# Patient Record
Sex: MALE | Race: WHITE | Employment: FULL TIME | URBAN - METROPOLITAN AREA
[De-identification: names, ages, dates, MRNs, and addresses within clinical notes are randomized per-mention and may not be internally consistent; named-entity substitution may affect disease eponyms.]

---

## 2018-05-30 VITALS
WEIGHT: 287 LBS | HEIGHT: 76 IN | DIASTOLIC BLOOD PRESSURE: 80 MMHG | RESPIRATION RATE: 16 BRPM | TEMPERATURE: 97.3 F | BODY MASS INDEX: 34.95 KG/M2 | SYSTOLIC BLOOD PRESSURE: 124 MMHG | HEART RATE: 96 BPM

## 2020-01-08 ENCOUNTER — HOSPITAL ENCOUNTER (EMERGENCY)
Age: 50
Discharge: HOME | End: 2020-01-08
Attending: EMERGENCY MEDICINE
Payer: COMMERCIAL

## 2020-01-08 VITALS
TEMPERATURE: 98.2 F | RESPIRATION RATE: 17 BRPM | OXYGEN SATURATION: 95 % | SYSTOLIC BLOOD PRESSURE: 125 MMHG | DIASTOLIC BLOOD PRESSURE: 80 MMHG | HEART RATE: 83 BPM

## 2020-01-08 PROCEDURE — 99282 EMERGENCY DEPT VISIT SF MDM: CPT

## 2020-01-08 ASSESSMENT — PAIN - FUNCTIONAL ASSESSMENT: PAIN_FUNCTIONAL_ASSESSMENT: ACTIVITIES ARE NOT PREVENTED

## 2020-01-08 ASSESSMENT — ENCOUNTER SYMPTOMS
PHOTOPHOBIA: 0
EYE REDNESS: 1

## 2020-01-08 ASSESSMENT — PAIN DESCRIPTION - FREQUENCY: FREQUENCY: INTERMITTENT

## 2020-01-08 ASSESSMENT — PAIN SCALES - GENERAL: PAINLEVEL_OUTOF10: 2

## 2020-01-08 ASSESSMENT — PAIN DESCRIPTION - DESCRIPTORS: DESCRIPTORS: PINS AND NEEDLES

## 2020-01-08 ASSESSMENT — PAIN DESCRIPTION - ONSET: ONSET: GRADUAL

## 2020-01-08 ASSESSMENT — PAIN DESCRIPTION - ORIENTATION: ORIENTATION: LEFT

## 2020-01-08 ASSESSMENT — PAIN DESCRIPTION - LOCATION: LOCATION: EYE

## 2020-01-08 ASSESSMENT — PAIN DESCRIPTION - PROGRESSION: CLINICAL_PROGRESSION: GRADUALLY WORSENING

## 2020-01-08 ASSESSMENT — PAIN DESCRIPTION - PAIN TYPE: TYPE: ACUTE PAIN

## 2020-01-08 NOTE — ED TRIAGE NOTES
Pt came in for concerns of pink eye in left eye. Pt states it started yesterday. Pt noticed pink coloration and swelling. Pt states it is an intermittent pain.

## 2020-01-08 NOTE — ED PROVIDER NOTES
normal.  Neck: Neck supple. No tracheal deviation present. CV: Normal rate. Regular rhythm. Chest: Effort normal.   Neurological: Alert and appropriately interactive. Face symmetric, speech without dysarthria or obvious aphasia. Moving all extremities spontaneously. Skin: Warm, dry. No rash. No diaphoresis or erythema. Psychiatric: Calm and cooperative with appropriate mood and affect. Diagnostic Results       RADIOLOGY:  No orders to display       LABS:   No results found for this visit on 01/08/20. RECENT VITALS:   , ,  ,  ,       Procedures   Procedures      ED Course     Nursing Notes, Past Medical Hx, Past Surgical Hx, Social Hx,Allergies, and Family Hx were reviewed. Patient was given the following medications:  No orders of the defined types were placed in this encounter. CONSULTS:  None    MEDICAL DECISIONMAKING / ASSESSMENT / Di Everett is a 44 y.o. male presenting for left eye redness and irritation. On arrival he is in no distress and vital signs reassuring. Has conjunctival erythema with eric-limbic sparing consistent with viral conjunctivitis given the lack of purulence and only mild crusting. Instructed to use antihistamine eyedrops as this will improve the redness and irritation and otherwise maintain good hand hygiene and to not come in contact with the other eye so as to avoid infection there. All questions answered to satisfaction. Discharged in stable condition with written and verbal instructions for which to return to the ED. Clinical Impression     1.  Acute viral conjunctivitis of left eye        Disposition     PATIENT REFERRED TO:  [facility] Emergency Department  [address]  [place] [address]  [phone]    If symptoms worsen    [facility]  [address]   [phone]    Schedule an appointment as soon as possible for a visit   To establish care      DISCHARGE

## 2022-04-11 ENCOUNTER — HOSPITAL ENCOUNTER (INPATIENT)
Age: 52
LOS: 1 days | Discharge: HOME | DRG: 392 | End: 2022-04-12
Attending: EMERGENCY MEDICINE | Admitting: INTERNAL MEDICINE
Payer: COMMERCIAL

## 2022-04-11 ENCOUNTER — APPOINTMENT (OUTPATIENT)
Dept: CT IMAGING | Age: 52
DRG: 392 | End: 2022-04-11
Payer: COMMERCIAL

## 2022-04-11 ENCOUNTER — APPOINTMENT (OUTPATIENT)
Dept: GENERAL RADIOLOGY | Age: 52
DRG: 392 | End: 2022-04-11
Payer: COMMERCIAL

## 2022-04-11 DIAGNOSIS — E86.1 HYPOTENSION DUE TO HYPOVOLEMIA: ICD-10-CM

## 2022-04-11 DIAGNOSIS — I95.89 HYPOTENSION DUE TO HYPOVOLEMIA: ICD-10-CM

## 2022-04-11 DIAGNOSIS — E86.0 DEHYDRATION: Primary | ICD-10-CM

## 2022-04-11 PROBLEM — R07.9 CHEST PAIN: Status: ACTIVE | Noted: 2022-04-11

## 2022-04-11 LAB
ANION GAP SERPL CALCULATED.3IONS-SCNC: 14 MMOL/L (ref 3–16)
ANION GAP SERPL CALCULATED.3IONS-SCNC: 15 MMOL/L (ref 3–16)
BASOPHILS ABSOLUTE: 0 K/UL (ref 0–0.2)
BASOPHILS RELATIVE PERCENT: 0.4 %
BUN BLDV-MCNC: 12 MG/DL (ref 7–20)
BUN BLDV-MCNC: 9 MG/DL (ref 7–20)
CALCIUM SERPL-MCNC: 9.4 MG/DL (ref 8.3–10.6)
CALCIUM SERPL-MCNC: 9.9 MG/DL (ref 8.3–10.6)
CHLORIDE BLD-SCNC: 104 MMOL/L (ref 99–110)
CHLORIDE BLD-SCNC: 106 MMOL/L (ref 99–110)
CO2: 19 MMOL/L (ref 21–32)
CO2: 25 MMOL/L (ref 21–32)
CREAT SERPL-MCNC: 0.7 MG/DL (ref 0.9–1.3)
CREAT SERPL-MCNC: 0.8 MG/DL (ref 0.9–1.3)
EKG ATRIAL RATE: 104 BPM
EKG ATRIAL RATE: 59 BPM
EKG ATRIAL RATE: 73 BPM
EKG DIAGNOSIS: NORMAL
EKG P AXIS: 54 DEGREES
EKG P AXIS: 56 DEGREES
EKG P AXIS: 66 DEGREES
EKG P-R INTERVAL: 170 MS
EKG P-R INTERVAL: 192 MS
EKG P-R INTERVAL: 200 MS
EKG Q-T INTERVAL: 354 MS
EKG Q-T INTERVAL: 410 MS
EKG Q-T INTERVAL: 440 MS
EKG QRS DURATION: 126 MS
EKG QRS DURATION: 90 MS
EKG QRS DURATION: 92 MS
EKG QTC CALCULATION (BAZETT): 435 MS
EKG QTC CALCULATION (BAZETT): 451 MS
EKG QTC CALCULATION (BAZETT): 465 MS
EKG R AXIS: 39 DEGREES
EKG R AXIS: 60 DEGREES
EKG R AXIS: 81 DEGREES
EKG T AXIS: 28 DEGREES
EKG T AXIS: 57 DEGREES
EKG T AXIS: 69 DEGREES
EKG VENTRICULAR RATE: 104 BPM
EKG VENTRICULAR RATE: 59 BPM
EKG VENTRICULAR RATE: 73 BPM
EOSINOPHILS ABSOLUTE: 0.1 K/UL (ref 0–0.6)
EOSINOPHILS RELATIVE PERCENT: 1.4 %
GFR AFRICAN AMERICAN: >60
GFR AFRICAN AMERICAN: >60
GFR NON-AFRICAN AMERICAN: >60
GFR NON-AFRICAN AMERICAN: >60
GLUCOSE BLD-MCNC: 133 MG/DL (ref 70–99)
GLUCOSE BLD-MCNC: 138 MG/DL (ref 70–99)
GLUCOSE BLD-MCNC: 145 MG/DL (ref 70–99)
GLUCOSE BLD-MCNC: 152 MG/DL (ref 70–99)
GLUCOSE BLD-MCNC: 153 MG/DL (ref 70–99)
GLUCOSE BLD-MCNC: 156 MG/DL (ref 70–99)
GLUCOSE BLD-MCNC: 159 MG/DL (ref 70–99)
HCT VFR BLD CALC: 43.6 % (ref 40.5–52.5)
HEMOGLOBIN: 14.8 G/DL (ref 13.5–17.5)
LACTIC ACID: 2.3 MMOL/L (ref 0.4–2)
LACTIC ACID: 2.6 MMOL/L (ref 0.4–2)
LV EF: 58 %
LV EF: 73 %
LVEF MODALITY: NORMAL
LVEF MODALITY: NORMAL
LYMPHOCYTES ABSOLUTE: 2.7 K/UL (ref 1–5.1)
LYMPHOCYTES RELATIVE PERCENT: 29.1 %
MAGNESIUM: 2.1 MG/DL (ref 1.8–2.4)
MCH RBC QN AUTO: 31.1 PG (ref 26–34)
MCHC RBC AUTO-ENTMCNC: 34 G/DL (ref 31–36)
MCV RBC AUTO: 91.6 FL (ref 80–100)
MONOCYTES ABSOLUTE: 0.8 K/UL (ref 0–1.3)
MONOCYTES RELATIVE PERCENT: 9.1 %
NEUTROPHILS ABSOLUTE: 5.5 K/UL (ref 1.7–7.7)
NEUTROPHILS RELATIVE PERCENT: 60 %
PDW BLD-RTO: 13.7 % (ref 12.4–15.4)
PERFORMED ON: ABNORMAL
PLATELET # BLD: 272 K/UL (ref 135–450)
PMV BLD AUTO: 7.5 FL (ref 5–10.5)
POTASSIUM REFLEX MAGNESIUM: 3.3 MMOL/L (ref 3.5–5.1)
POTASSIUM REFLEX MAGNESIUM: 4.2 MMOL/L (ref 3.5–5.1)
PRO-BNP: <5 PG/ML (ref 0–124)
RBC # BLD: 4.77 M/UL (ref 4.2–5.9)
SODIUM BLD-SCNC: 140 MMOL/L (ref 136–145)
SODIUM BLD-SCNC: 143 MMOL/L (ref 136–145)
TROPONIN: <0.01 NG/ML
WBC # BLD: 9.2 K/UL (ref 4–11)

## 2022-04-11 PROCEDURE — 96361 HYDRATE IV INFUSION ADD-ON: CPT

## 2022-04-11 PROCEDURE — 83735 ASSAY OF MAGNESIUM: CPT

## 2022-04-11 PROCEDURE — 6370000000 HC RX 637 (ALT 250 FOR IP): Performed by: INTERNAL MEDICINE

## 2022-04-11 PROCEDURE — 96375 TX/PRO/DX INJ NEW DRUG ADDON: CPT

## 2022-04-11 PROCEDURE — 99284 EMERGENCY DEPT VISIT MOD MDM: CPT

## 2022-04-11 PROCEDURE — A9502 TC99M TETROFOSMIN: HCPCS

## 2022-04-11 PROCEDURE — 36415 COLL VENOUS BLD VENIPUNCTURE: CPT

## 2022-04-11 PROCEDURE — 80048 BASIC METABOLIC PNL TOTAL CA: CPT

## 2022-04-11 PROCEDURE — 6360000002 HC RX W HCPCS

## 2022-04-11 PROCEDURE — C9113 INJ PANTOPRAZOLE SODIUM, VIA: HCPCS | Performed by: INTERNAL MEDICINE

## 2022-04-11 PROCEDURE — 99223 1ST HOSP IP/OBS HIGH 75: CPT | Performed by: INTERNAL MEDICINE

## 2022-04-11 PROCEDURE — 84484 ASSAY OF TROPONIN QUANT: CPT

## 2022-04-11 PROCEDURE — 96374 THER/PROPH/DIAG INJ IV PUSH: CPT

## 2022-04-11 PROCEDURE — 96372 THER/PROPH/DIAG INJ SC/IM: CPT

## 2022-04-11 PROCEDURE — 93005 ELECTROCARDIOGRAM TRACING: CPT | Performed by: INTERNAL MEDICINE

## 2022-04-11 PROCEDURE — 2580000003 HC RX 258: Performed by: INTERNAL MEDICINE

## 2022-04-11 PROCEDURE — 83880 ASSAY OF NATRIURETIC PEPTIDE: CPT

## 2022-04-11 PROCEDURE — 78452 HT MUSCLE IMAGE SPECT MULT: CPT

## 2022-04-11 PROCEDURE — 1200000000 HC SEMI PRIVATE

## 2022-04-11 PROCEDURE — 6360000002 HC RX W HCPCS: Performed by: INTERNAL MEDICINE

## 2022-04-11 PROCEDURE — 2580000003 HC RX 258: Performed by: EMERGENCY MEDICINE

## 2022-04-11 PROCEDURE — 93010 ELECTROCARDIOGRAM REPORT: CPT | Performed by: INTERNAL MEDICINE

## 2022-04-11 PROCEDURE — 93017 CV STRESS TEST TRACING ONLY: CPT

## 2022-04-11 PROCEDURE — 71045 X-RAY EXAM CHEST 1 VIEW: CPT

## 2022-04-11 PROCEDURE — 6360000004 HC RX CONTRAST MEDICATION: Performed by: EMERGENCY MEDICINE

## 2022-04-11 PROCEDURE — 93306 TTE W/DOPPLER COMPLETE: CPT

## 2022-04-11 PROCEDURE — 85025 COMPLETE CBC W/AUTO DIFF WBC: CPT

## 2022-04-11 PROCEDURE — 3430000000 HC RX DIAGNOSTIC RADIOPHARMACEUTICAL

## 2022-04-11 PROCEDURE — 6360000002 HC RX W HCPCS: Performed by: EMERGENCY MEDICINE

## 2022-04-11 PROCEDURE — 83605 ASSAY OF LACTIC ACID: CPT

## 2022-04-11 PROCEDURE — G0378 HOSPITAL OBSERVATION PER HR: HCPCS

## 2022-04-11 PROCEDURE — 74174 CTA ABD&PLVS W/CONTRAST: CPT

## 2022-04-11 PROCEDURE — 93005 ELECTROCARDIOGRAM TRACING: CPT | Performed by: EMERGENCY MEDICINE

## 2022-04-11 RX ORDER — SODIUM CHLORIDE 0.9 % (FLUSH) 0.9 %
5-40 SYRINGE (ML) INJECTION PRN
Status: DISCONTINUED | OUTPATIENT
Start: 2022-04-11 | End: 2022-04-12 | Stop reason: HOSPADM

## 2022-04-11 RX ORDER — ONDANSETRON 2 MG/ML
8 INJECTION INTRAMUSCULAR; INTRAVENOUS ONCE
Status: COMPLETED | OUTPATIENT
Start: 2022-04-11 | End: 2022-04-11

## 2022-04-11 RX ORDER — SODIUM CHLORIDE, SODIUM LACTATE, POTASSIUM CHLORIDE, AND CALCIUM CHLORIDE .6; .31; .03; .02 G/100ML; G/100ML; G/100ML; G/100ML
1000 INJECTION, SOLUTION INTRAVENOUS ONCE
Status: COMPLETED | OUTPATIENT
Start: 2022-04-11 | End: 2022-04-11

## 2022-04-11 RX ORDER — SODIUM CHLORIDE 0.9 % (FLUSH) 0.9 %
10 SYRINGE (ML) INJECTION PRN
Status: DISCONTINUED | OUTPATIENT
Start: 2022-04-11 | End: 2022-04-12 | Stop reason: HOSPADM

## 2022-04-11 RX ORDER — ONDANSETRON 4 MG/1
4 TABLET, ORALLY DISINTEGRATING ORAL EVERY 8 HOURS PRN
Status: DISCONTINUED | OUTPATIENT
Start: 2022-04-11 | End: 2022-04-12 | Stop reason: HOSPADM

## 2022-04-11 RX ORDER — ONDANSETRON 2 MG/ML
4 INJECTION INTRAMUSCULAR; INTRAVENOUS EVERY 6 HOURS PRN
Status: DISCONTINUED | OUTPATIENT
Start: 2022-04-11 | End: 2022-04-12 | Stop reason: HOSPADM

## 2022-04-11 RX ORDER — SODIUM CHLORIDE 0.9 % (FLUSH) 0.9 %
5-40 SYRINGE (ML) INJECTION EVERY 12 HOURS SCHEDULED
Status: DISCONTINUED | OUTPATIENT
Start: 2022-04-11 | End: 2022-04-12 | Stop reason: HOSPADM

## 2022-04-11 RX ORDER — POTASSIUM CHLORIDE 7.45 MG/ML
10 INJECTION INTRAVENOUS PRN
Status: DISCONTINUED | OUTPATIENT
Start: 2022-04-11 | End: 2022-04-12 | Stop reason: HOSPADM

## 2022-04-11 RX ORDER — ACETAMINOPHEN 325 MG/1
650 TABLET ORAL EVERY 6 HOURS PRN
Status: DISCONTINUED | OUTPATIENT
Start: 2022-04-11 | End: 2022-04-12 | Stop reason: HOSPADM

## 2022-04-11 RX ORDER — PANTOPRAZOLE SODIUM 40 MG/10ML
40 INJECTION, POWDER, LYOPHILIZED, FOR SOLUTION INTRAVENOUS DAILY
Status: DISCONTINUED | OUTPATIENT
Start: 2022-04-11 | End: 2022-04-12 | Stop reason: HOSPADM

## 2022-04-11 RX ORDER — POTASSIUM CHLORIDE 20 MEQ/1
40 TABLET, EXTENDED RELEASE ORAL PRN
Status: DISCONTINUED | OUTPATIENT
Start: 2022-04-11 | End: 2022-04-12 | Stop reason: HOSPADM

## 2022-04-11 RX ORDER — SODIUM CHLORIDE 9 MG/ML
INJECTION, SOLUTION INTRAVENOUS CONTINUOUS
Status: DISCONTINUED | OUTPATIENT
Start: 2022-04-11 | End: 2022-04-12 | Stop reason: HOSPADM

## 2022-04-11 RX ORDER — SODIUM CHLORIDE 9 MG/ML
INJECTION, SOLUTION INTRAVENOUS PRN
Status: DISCONTINUED | OUTPATIENT
Start: 2022-04-11 | End: 2022-04-12 | Stop reason: HOSPADM

## 2022-04-11 RX ORDER — ASPIRIN 81 MG/1
81 TABLET, CHEWABLE ORAL DAILY
Status: DISCONTINUED | OUTPATIENT
Start: 2022-04-11 | End: 2022-04-12 | Stop reason: HOSPADM

## 2022-04-11 RX ORDER — INSULIN DETEMIR 100 [IU]/ML
10 INJECTION, SOLUTION SUBCUTANEOUS NIGHTLY
COMMUNITY

## 2022-04-11 RX ORDER — POLYETHYLENE GLYCOL 3350 17 G/17G
17 POWDER, FOR SOLUTION ORAL DAILY PRN
Status: DISCONTINUED | OUTPATIENT
Start: 2022-04-11 | End: 2022-04-12 | Stop reason: HOSPADM

## 2022-04-11 RX ORDER — ATORVASTATIN CALCIUM 40 MG/1
40 TABLET, FILM COATED ORAL NIGHTLY
Status: DISCONTINUED | OUTPATIENT
Start: 2022-04-11 | End: 2022-04-12 | Stop reason: HOSPADM

## 2022-04-11 RX ORDER — ACETAMINOPHEN 650 MG/1
650 SUPPOSITORY RECTAL EVERY 6 HOURS PRN
Status: DISCONTINUED | OUTPATIENT
Start: 2022-04-11 | End: 2022-04-12 | Stop reason: HOSPADM

## 2022-04-11 RX ORDER — POTASSIUM CHLORIDE 20 MEQ/1
40 TABLET, EXTENDED RELEASE ORAL ONCE
Status: COMPLETED | OUTPATIENT
Start: 2022-04-11 | End: 2022-04-11

## 2022-04-11 RX ORDER — DROPERIDOL 2.5 MG/ML
0.62 INJECTION, SOLUTION INTRAMUSCULAR; INTRAVENOUS EVERY 6 HOURS PRN
Status: DISCONTINUED | OUTPATIENT
Start: 2022-04-11 | End: 2022-04-12 | Stop reason: HOSPADM

## 2022-04-11 RX ORDER — M-VIT,TX,IRON,MINS/CALC/FOLIC 27MG-0.4MG
1 TABLET ORAL DAILY
COMMUNITY

## 2022-04-11 RX ADMIN — SODIUM CHLORIDE: 9 INJECTION, SOLUTION INTRAVENOUS at 08:26

## 2022-04-11 RX ADMIN — PANTOPRAZOLE SODIUM 40 MG: 40 INJECTION, POWDER, FOR SOLUTION INTRAVENOUS at 09:52

## 2022-04-11 RX ADMIN — Medication 10 ML: at 14:29

## 2022-04-11 RX ADMIN — TETROFOSMIN 30 MILLICURIE: 1.38 INJECTION, POWDER, LYOPHILIZED, FOR SOLUTION INTRAVENOUS at 14:28

## 2022-04-11 RX ADMIN — ATORVASTATIN CALCIUM 40 MG: 40 TABLET, FILM COATED ORAL at 20:42

## 2022-04-11 RX ADMIN — ENOXAPARIN SODIUM 40 MG: 40 INJECTION SUBCUTANEOUS at 09:52

## 2022-04-11 RX ADMIN — IOPAMIDOL 80 ML: 755 INJECTION, SOLUTION INTRAVENOUS at 04:09

## 2022-04-11 RX ADMIN — ASPIRIN 81 MG 81 MG: 81 TABLET ORAL at 09:52

## 2022-04-11 RX ADMIN — SODIUM CHLORIDE, SODIUM LACTATE, POTASSIUM CHLORIDE, AND CALCIUM CHLORIDE 1000 ML: .6; .31; .03; .02 INJECTION, SOLUTION INTRAVENOUS at 05:50

## 2022-04-11 RX ADMIN — Medication 10 ML: at 12:44

## 2022-04-11 RX ADMIN — REGADENOSON 0.4 MG: 0.08 INJECTION, SOLUTION INTRAVENOUS at 14:19

## 2022-04-11 RX ADMIN — SODIUM CHLORIDE: 9 INJECTION, SOLUTION INTRAVENOUS at 18:02

## 2022-04-11 RX ADMIN — SODIUM CHLORIDE, POTASSIUM CHLORIDE, SODIUM LACTATE AND CALCIUM CHLORIDE 1000 ML: 600; 310; 30; 20 INJECTION, SOLUTION INTRAVENOUS at 03:00

## 2022-04-11 RX ADMIN — ONDANSETRON 8 MG: 2 INJECTION INTRAMUSCULAR; INTRAVENOUS at 02:45

## 2022-04-11 RX ADMIN — POTASSIUM CHLORIDE 40 MEQ: 1500 TABLET, EXTENDED RELEASE ORAL at 09:52

## 2022-04-11 RX ADMIN — ONDANSETRON 4 MG: 2 INJECTION INTRAMUSCULAR; INTRAVENOUS at 18:02

## 2022-04-11 RX ADMIN — DROPERIDOL 0.62 MG: 2.5 INJECTION, SOLUTION INTRAMUSCULAR; INTRAVENOUS at 04:24

## 2022-04-11 RX ADMIN — TETROFOSMIN 10 MILLICURIE: 1.38 INJECTION, POWDER, LYOPHILIZED, FOR SOLUTION INTRAVENOUS at 12:25

## 2022-04-11 ASSESSMENT — PAIN - FUNCTIONAL ASSESSMENT
PAIN_FUNCTIONAL_ASSESSMENT: ACTIVITIES ARE NOT PREVENTED
PAIN_FUNCTIONAL_ASSESSMENT: 0-10

## 2022-04-11 ASSESSMENT — PAIN SCALES - GENERAL
PAINLEVEL_OUTOF10: 10
PAINLEVEL_OUTOF10: 0
PAINLEVEL_OUTOF10: 0
PAINLEVEL_OUTOF10: 8

## 2022-04-11 ASSESSMENT — PAIN DESCRIPTION - PAIN TYPE
TYPE: ACUTE PAIN
TYPE: ACUTE PAIN

## 2022-04-11 ASSESSMENT — ENCOUNTER SYMPTOMS
NAUSEA: 1
VOMITING: 1

## 2022-04-11 ASSESSMENT — PAIN DESCRIPTION - PROGRESSION: CLINICAL_PROGRESSION: NOT CHANGED

## 2022-04-11 ASSESSMENT — PAIN DESCRIPTION - LOCATION
LOCATION: CHEST
LOCATION: BACK

## 2022-04-11 ASSESSMENT — PAIN DESCRIPTION - FREQUENCY
FREQUENCY: CONTINUOUS
FREQUENCY: CONTINUOUS

## 2022-04-11 ASSESSMENT — PAIN DESCRIPTION - DESCRIPTORS
DESCRIPTORS: SHOOTING
DESCRIPTORS: ACHING

## 2022-04-11 ASSESSMENT — PAIN DESCRIPTION - ORIENTATION
ORIENTATION: MID
ORIENTATION: LOWER

## 2022-04-11 ASSESSMENT — PAIN DESCRIPTION - ONSET: ONSET: ON-GOING

## 2022-04-11 NOTE — CONSULTS
Clinical Pharmacy Progress Note  Medication History      Asked to verify home medications by Dr. Lynnette Bennett. List of of current medications patient is taking is complete. Home Medication list in EPIC updated to reflect changes noted below. Source of information: patient interview, Surescripts pharmacy dispenses     Pt only takes Levemir insulin, Humulin R insulin, and a multivitamin.       Please call with questions--  Thanks--  Vijay Rose, NadjaD, BCPS, BCGP  [id number] ([facility])   4/11/2022 8:18 AM      Current Outpatient Medications   Medication Instructions    insulin regular (HUMULIN R;NOVOLIN R) 10 Units, SubCUTAneous, 3 TIMES DAILY BEFORE MEALS    Levemir FlexTouch 10 Units, SubCUTAneous, NIGHTLY    Multiple Vitamins-Minerals (THERAPEUTIC MULTIVITAMIN-MINERALS) tablet 1 tablet, Oral, DAILY

## 2022-04-11 NOTE — ED PROVIDER NOTES
[address] [facility]          ATTENDING PHYSICIAN NOTE       Date of evaluation: 4/11/2022    Chief Complaint     Chest Pain      History of Present Illness     Adriana Mcadams is a 45 y.o. male who presents with epigastric pain that awakened him from sleep. States that he was feeling fine throughout the day without any pain. He had noticed some slight difficulty breathing intermittently. He has not any cough or fever. Has not noticed any pain or swelling in his lower extremities. States that he does feel sick to his stomach. Review of Systems     Review of Systems   Constitutional: Positive for activity change, diaphoresis and fatigue. Negative for chills and fever. Cardiovascular: Positive for chest pain. Negative for palpitations and leg swelling. Gastrointestinal: Positive for nausea and vomiting. Neurological: Positive for light-headedness. All other systems reviewed and are negative. Past Medical, Surgical, Family, and Social History     He has a past medical history of Diabetes mellitus ([facility] [address] and Sleep apnea. He has a past surgical history that includes Umbilical hernia repair (12/18/15). His family history includes Cancer in his father; Newt Myra in his father; Diabetes in his mother; Heart Attack in his father. He reports that he has never smoked. He has never used smokeless tobacco. He reports current alcohol use of about 2.0 - 4.0 standard drinks of alcohol per week. He reports that he does not use drugs. Medications     Previous Medications    No medications on file       Allergies     He is allergic to molds & smuts, other, and pollen extract. Physical Exam     INITIAL VITALS: BP: 99/62, Temp: 97.6 °F (36.4 °C), Pulse: 65, Resp: 17, SpO2: 98 %   Physical Exam  Vitals and nursing note reviewed. Constitutional:       Appearance: He is well-developed. He is ill-appearing and diaphoretic. HENT:      Head: Normocephalic and atraumatic. Eyes:      Pupils: Pupils are equal, round, and reactive to light. Cardiovascular:      Rate and Rhythm: Normal rate and regular rhythm. Heart sounds: Normal heart sounds. No murmur heard. Pulmonary:      Effort: No respiratory distress. Breath sounds: Normal breath sounds. No wheezing or rales. Abdominal:      General: There is no distension. Tenderness: There is abdominal tenderness in the epigastric area. There is no guarding or rebound. Musculoskeletal:         General: Normal range of motion. Cervical back: Normal range of motion. Skin:     General: Skin is warm and moist.      Coloration: Skin is pale. Neurological:      Mental Status: He is alert and oriented to person, place, and time. Cranial Nerves: No cranial nerve deficit. Coordination: Coordination normal.         Diagnostic Results     EKG   EKG Interpretation    Interpreted by emergency department physician    Rhythm: normal sinus   Rate: normal  Axis: normal  Ectopy: none  Conduction: normal  ST Segments: normal  T Waves: normal  Q Waves: v1 and v2    Clinical Impression: non-specific EKG    Janina Yates MD      RADIOLOGY:  CTA CHEST ABDOMEN PELVIS W CONTRAST   Final Result   Negative chest CTA. No evidence of thoracic aortic aneurysm or    dissection. _______________________________________________       IMPRESSION:          No acute findings in the arteries of the abdomen and pelvis. No    evidence of abdominal aortic aneurysm or dissection.           XR CHEST PORTABLE   Final Result     No acute findings in the chest.              LABS:   Results for orders placed or performed during the hospital encounter of 04/11/22   CBC with Auto Differential   Result Value Ref Range    WBC 9.2 4.0 - 11.0 K/uL    RBC 4.77 4.20 - 5.90 M/uL    Hemoglobin 14.8 13.5 - 17.5 g/dL    Hematocrit 43.6 40.5 - 52.5 %    MCV 91.6 80.0 - 100.0 fL    MCH 31.1 26.0 - 34.0 pg    MCHC 34.0 31.0 - 36.0 g/dL    RDW 13.7 12.4 - 15.4 %    Platelets 437 144 - 633 K/uL    MPV 7.5 5.0 - 10.5 fL    Neutrophils % 60.0 %    Lymphocytes % 29.1 %    Monocytes % 9.1 %    Eosinophils % 1.4 %    Basophils % 0.4 %    Neutrophils Absolute 5.5 1.7 - 7.7 K/uL    Lymphocytes Absolute 2.7 1.0 - 5.1 K/uL    Monocytes Absolute 0.8 0.0 - 1.3 K/uL    Eosinophils Absolute 0.1 0.0 - 0.6 K/uL    Basophils Absolute 0.0 0.0 - 0.2 K/uL   Basic Metabolic Panel w/ Reflex to MG   Result Value Ref Range    Sodium 143 136 - 145 mmol/L    Potassium reflex Magnesium 3.3 (L) 3.5 - 5.1 mmol/L    Chloride 104 99 - 110 mmol/L    CO2 25 21 - 32 mmol/L    Anion Gap 14 3 - 16    Glucose 159 (H) 70 - 99 mg/dL    BUN 12 7 - 20 mg/dL    CREATININE 0.8 (L) 0.9 - 1.3 mg/dL    GFR Non-African American >60 >60    GFR African American >60 >60    Calcium 9.9 8.3 - 10.6 mg/dL   Troponin   Result Value Ref Range    Troponin <0.01 <0.01 ng/mL   Brain Natriuretic Peptide   Result Value Ref Range    Pro-BNP <5 0 - 124 pg/mL   Magnesium   Result Value Ref Range    Magnesium 2.10 1.80 - 2.40 mg/dL   Troponin   Result Value Ref Range    Troponin <0.01 <0.01 ng/mL   POCT Glucose   Result Value Ref Range    POC Glucose 138 (H) 70 - 99 mg/dl    Performed on ACCU-CHEK        RECENT VITALS:  BP: 99/66,Temp: 97.6 °F (36.4 °C), Pulse: 59, Resp: 18, SpO2: 100 %       ED Course     Nursing Notes, Past Medical Hx, Past Surgical Hx, Social Hx,Allergies, and Family Hx were reviewed. patient was given the following medications:  Orders Placed This Encounter   Medications    lactated ringers bolus    ondansetron (ZOFRAN) injection 8 mg    iopamidol (ISOVUE-370) 76 % injection 80 mL    droperidol (INAPSINE) injection 0.625 mg    lactated ringers bolus       CONSULTS:  IP CONSULT TO HOSPITALIST  IP CONSULT TO CARDIOLOGY  IP CONSULT TO [address] [place] [address] DECISIONMAKING / ASSESSMENT / Claven Willie is a 45 y.o. male who presented diaphoretic with epigastric pain.   There was concern for MI but no evidence of ST elevation MI on EKG and no elevation of troponin to suggest NSTEMI. Patient had a CT scan performed looking for dissection with his hypotension diaphoresis and pain. There was no evidence of such. In fact, the CT scan did not reveal any underlying cause for pain or hypotension. Patient was given fluids and antiemetics with some relief. This may have been purely a significant vasovagal reaction to some sort of GI upset. Due to initial presentation, will admit to the hospital for ongoing hydration and monitoring. Clinical Impression     1. Dehydration    2. Hypotension due to hypovolemia        Disposition     PATIENT REFERRED TO:  No follow-up provider specified.     DISCHARGE MEDICATIONS:  New Prescriptions    No medications on file       DISPOSITION Admitted 04/11/2022 06:05:29 AM       Sukumar Harper MD  04/11/22 0506

## 2022-04-11 NOTE — ED TRIAGE NOTES
Pt states that \"he was woke up from his sleep with 10/10 shooting chest pain\". VSS. EKG done. PIV in place.

## 2022-04-11 NOTE — PLAN OF CARE
Problem: Falls - Risk of:  Goal: Will remain free from falls  Description: Will remain free from falls  Outcome: Ongoing  Note: Patient is not a fall risk. Bed is in low, lock position; call light/belongings within reach. No attempts to get out of bed have been made, calls appropriately if assistance is needed. Problem: Pain:  Description: Pain management should include both nonpharmacologic and pharmacologic interventions. Goal: Pain level will decrease  Description: Pain level will decrease  Outcome: Ongoing  Note: Pt having pain to his mid sternum region, not new, remains unchanged. Pt states pain doesn't radiate anywhere else in his body. Pt remains on tele. Morning medications given. Heat pack given for pt to apply to chest.  Pt to be seen by cardiology.

## 2022-04-11 NOTE — LETTER
[address] [facility] [address]  Phone: [phone]             April 12, 2022    Patient: Zahra Li   YOB: 1970   Date of Visit: 4/11/2022       To Whom It May Concern:    Wale Yuen was seen and treated in our facility  beginning 4/11/2022 until 4/12/2022 . He may return to work on 4/18/2022.       Sincerely,       Ana Barnhart RN         Signature:__________________________________

## 2022-04-11 NOTE — CONSULTS
[facility]  Cardiology Inpatient Consult Service                                                                                          Pt Name: Irena Echeverria  Age: 45 y.o. Sex: male  : 1970  Location: [facility]7/6307-01    Referring Physician: Cody Padron MD      Reason for Consult:       Reason for Consultation/Chief Complaint: Chest Pain      HPI:      Irena Echeverria is a 45 y.o. male with a past medical history of T2DM on insulin, who presented to the hospital with complaints of epigastric pain that woke him up from his sleep. It was associated with difficulty in breathing. He was seen and examined at bedside and reported resolution of the chest pain, he denied any fevers, chills, shortness of breath. Chest clear to auscultation, no leg swelling. Histories     Past Medical History:   has a past medical history of Diabetes mellitus ([address] and Sleep apnea. Surgical History:   has a past surgical history that includes Umbilical hernia repair (12/18/15). Social History:   reports that he has never smoked. He has never used smokeless tobacco. He reports current alcohol use of about 2.0 - 4.0 standard drinks of alcohol per week. He reports that he does not use drugs. Family History:  No evidence for sudden cardiac death or premature CAD      Medications:       Home Medications  Were reviewed and are listed in nursing record. and/or listed below  Prior to Admission medications    Medication Sig Start Date End Date Taking?  Authorizing Provider   insulin detemir (LEVEMIR FLEXTOUCH) 100 UNIT/ML injection pen Inject 10 Units into the skin nightly   Yes Historical Provider, MD   insulin regular (HUMULIN R;NOVOLIN R) 100 UNIT/ML injection Inject 10 Units into the skin 3 times daily (before meals)   Yes Historical Provider, MD   Multiple Vitamins-Minerals (THERAPEUTIC MULTIVITAMIN-MINERALS) tablet Take 1 tablet by mouth daily   Yes Historical Provider, MD Inpatient Medications:   pantoprazole  40 mg IntraVENous Daily    sodium chloride flush  5-40 mL IntraVENous 2 times per day    enoxaparin  40 mg SubCUTAneous Daily    aspirin  81 mg Oral Daily       IV drips:   sodium chloride 125 mL/hr at 04/11/22 0826    sodium chloride         PRN:  droperidol, perflutren lipid microspheres, sodium chloride flush, sodium chloride, ondansetron **OR** ondansetron, polyethylene glycol, acetaminophen **OR** acetaminophen, potassium chloride **OR** potassium alternative oral replacement **OR** potassium chloride, sodium chloride flush    Allergy:     Molds & smuts, Other, and Pollen extract       Review of Systems:     All 12 point review of symptoms completed. Pertinent positives identified in the HPI, all other review of symptoms negative as below. CONSTITUTIONAL: Nounanticipated weight loss. No change in energy level, sleep pattern, or activity level. SKIN: No rash or pruritis. EYES: No visual changes or diplopia. No scleral icterus. ENT: No Headaches, hearing loss or vertigo. No mouth sores or sore throat. CARDIOVASCULAR: No chest pain/chest pressure/chest discomfort. No palpitations on exam this morning. RESPIRATORY: No cough or wheezing, no sputum production. No hematemesis. GASTROINTESTINAL: No N/V/D. No abdominal pain, appetite loss, blood in stools. GENITOURINARY: No dysuria, trouble voiding, or hematuria. MUSCULOSKELETAL:  No gait disturbance, weakness or joint complaints. NEUROLOGICAL: No headache, diplopia, change in muscle strength, numbness or tingling. No change in gait, balance, coordination, mood, affect, memory, mentation, behavior. PSHYCH: No anxiety, loss of interest, change in sexual behavior, feelings of self-harm, or confusion. ENDOCRINE: No malaise, fatigue or temperature intolerance. No excessive thirst, fluid intake, or urination. No tremor. HEMATOLOGIC: No abnormal bruising or bleeding.   ALLERGY: No nasal congestion or hives.      Physical Examination:     Vitals:    04/11/22 0600 04/11/22 0645 04/11/22 0817 04/11/22 1157   BP: 105/67 (!) 115/53 (!) 144/75 115/67   Pulse: 63 61 72 70   Resp: 20 16 18 16   Temp:  98.3 °F (36.8 °C) 98.3 °F (36.8 °C) 97.8 °F (36.6 °C)   TempSrc:  Oral Oral Oral   SpO2: 100% 99% 100% 100%   Weight:  221 lb 2 oz (100.3 kg)     Height:  6' 4\" (1.93 m)         Wt Readings from Last 3 Encounters:   04/11/22 221 lb 2 oz (100.3 kg)   12/30/15 287 lb (130.2 kg)   12/18/15 282 lb 6.6 oz (128.1 kg)       Objective      General Appearance:  Alert, cooperative, no distress, appears stated age Appropriate weight   Head:  Normocephalic, without obvious abnormality, atraumatic   Eyes:  PERRL, conjunctiva/corneas clear EOM intact  Ears normal   Throat no lesions       Nose: Nares normal, no drainage or sinus tenderness   Throat: Lips, mucosa, and tongue normal   Neck: Supple, symmetrical, trachea midline, no adenopathy, thyroid: not enlarged, symmetric, no tenderness/mass/nodules, no carotid bruit or JVD       Lungs:   Clear to auscultation bilaterally, respirations unlabored   Chest Wall:  No tenderness or deformity   Heart:  Regular rate and rhythm, S1, S2 normal, no murmur, rub or gallop PMI intact   Abdomen:   Soft, non-tender, bowel sounds active all four quadrants,  no masses, no organomegaly       Extremities: Extremities normal, atraumatic, no cyanosis or edema   Pulses: 2+ and symmetric   Skin: Skin color, texture, turgor normal, no rashes or lesions   Pysch: Normal mood and affect   Neurologic: Normal gross motor and sensory exam.  Cranial nerves intact        Labs:     Recent Labs     04/11/22  0236      K 3.3*   BUN 12   CREATININE 0.8*      CO2 25   GLUCOSE 159*   CALCIUM 9.9   MG 2.10     Recent Labs     04/11/22  0236   WBC 9.2   HGB 14.8   HCT 43.6      MCV 91.6     No results for input(s): CHOLTOT, TRIG, HDL, LDL in the last 72 hours.     Invalid input(s): Lexie Carmona, VLDCHOL  No results for input(s): PTT, INR in the last 72 hours. Invalid input(s): PT  Recent Labs     04/11/22  0236 04/11/22  0426 04/11/22  0746   TROPONINI <0.01 <0.01 <0.01     No results for input(s): BNP in the last 72 hours. No results for input(s): TSH in the last 72 hours. No results for input(s): CHOL, HDL, LDLCALC, TRIG in the last 72 hours.]    Lab Results   Component Value Date    TROPONINI <0.01 04/11/2022         Imaging:     I personally reviewed imaging studies including CXR, Stress test, TTE/ELVIS. Last ECG (if available) -personally interpreted EKG:  I have reviewed EKG with the following interpretation   NSR     Telemetry: NSR    Last Stress (if available): None available    Last TTE/ELVIS(if available):    Last Cath (if available):    Last CMR  (if available):      Assessment / Plan:     Atypical chest pain  T2DM, latest HbA1c 03/22  - 9.0 improved from 13.7 in 1/22  Trop x 2 normal  NSR without changes in the EKG  Calculated ASCVD risk 10.0%. Recommend statin on discharge. Echo today - normal EF and diastolic function. No evidence of MI or scar on nuclear stress test   Patient ok to be discharged from cardiology stand point. Thank you for allowing to us to participate in the care or Wellington Dasilva. Further evaluation will be based upon the patient's clinical course and testing results. The note was completed using EMR. Every effort was made to ensure accuracy; however, inadvertent computerized transcription errors may be present.  This  Patient was staffed and discussed with Dr. Becky Treadwell MD.    Belen Bowie MD  PGY-1

## 2022-04-11 NOTE — H&P
Hospital Medicine History & Physical      PCP: No primary care provider on file. Date of Admission: 4/11/2022    Date of Service: Pt seen/examined on  4/11/22 and Admitted to Inpatient    Chief Complaint:  Epigastric pain    History Of Present Illness: The patient is a 45 y.o. male  With pmhx of OAS, DM 2, who presented with sudden onset central chest pain that woke him up at night. Pain was severe,  pressure like, 10/1o in intensity. No radiation. No aggravating or relieving factors. Pain is improved now but still 4/10. He is comfortable now. Lactate was elevated on arrival to [facility]. No hx of smoking , no alcohol. Family hx of cancer in father. Past Medical History:        Diagnosis Date    Diabetes mellitus ([facility] [address]     Sleep apnea        Past Surgical History:        Procedure Laterality Date    UMBILICAL HERNIA REPAIR  12/18/15    with mesh        Medications Prior to Admission:    Prior to Admission medications    Medication Sig Start Date End Date Taking? Authorizing Provider   insulin detemir (LEVEMIR FLEXTOUCH) 100 UNIT/ML injection pen Inject 10 Units into the skin nightly   Yes Historical Provider, MD   insulin regular (HUMULIN R;NOVOLIN R) 100 UNIT/ML injection Inject 10 Units into the skin 3 times daily (before meals)   Yes Historical Provider, MD   Multiple Vitamins-Minerals (THERAPEUTIC MULTIVITAMIN-MINERALS) tablet Take 1 tablet by mouth daily   Yes Historical Provider, MD       Allergies:  Molds & smuts, Other, and Pollen extract    Social History:  The patient currently lives  At  Home with elderly mother    TOBACCO:   reports that he has never smoked. He has never used smokeless tobacco.  ETOH:   reports current alcohol use of about 2.0 - 4.0 standard drinks of alcohol per week.       Family History:  Reviewed in detail and negative for DM, Early CAD, Cancer, CVA. Positive as follows:        Problem Relation Age of [place]    Cancer Father     Heart Attack Father     Colon Cancer Father     Diabetes Mother        REVIEW OF SYSTEMS:   Positive  And negative as noted in the HPI. All other systems reviewed and negative. PHYSICAL EXAM:    BP (!) 144/75   Pulse 72   Temp 98.3 °F (36.8 °C) (Oral)   Resp 18   Ht 6' 4\" (1.93 m)   Wt 221 lb 2 oz (100.3 kg)   SpO2 100%   BMI 26.92 kg/m²     General appearance: No apparent distress appears stated age and cooperative. HEENT Normal cephalic, atraumatic without obvious deformity. Pupils equal, round, and reactive to light. Extra ocular muscles intact. Conjunctivae/corneas clear. Neck: Supple, No jugular venous distention/bruits. Trachea midline without thyromegaly or adenopathy with full range of motion. Lungs: Clear to auscultation, bilaterally without Rales/Wheezes/Rhonchi with good respiratory effort. Heart: Regular rate and rhythm with Normal S1/S2 without murmurs, rubs or gallops, point of maximum impulse non-displaced  Abdomen: Soft, non-tender or non-distended without rigidity or guarding and positive bowel sounds all four quadrants. Extremities: No clubbing, cyanosis, or edema bilaterally. Full range of motion without deformity and normal gait intact. Skin: Skin color, texture, turgor normal.  No rashes or lesions. Neurologic: Alert and oriented X 3, neurovascularly intact with sensory/motor intact upper extremities/lower extremities, bilaterally. Cranial nerves: II-XII intact, grossly non-focal.  Mental status: Alert, oriented, thought content appropriate.   Capillary Refill: Acceptable  < 3 seconds  Peripheral Pulses: +3 Easily felt, not easily obliterated with pressure      CXR:  I have reviewed the CXR with the following interpretation: normal study  EKG:  I have reviewed the EKG with the following interpretation: NSR    CBC   Recent Labs     04/11/22  0236   WBC 9.2   HGB 14.8   HCT 43.6       RENAL  Recent Labs     04/11/22  0236      K 3.3*      CO2 25   BUN 12   CREATININE 0.8*     LFT'S  No results for input(s): AST, ALT, ALB, BILIDIR, BILITOT, ALKPHOS in the last 72 hours. COAG  No results for input(s): INR in the last 72 hours. CARDIAC ENZYMES  Recent Labs     04/11/22  0236 04/11/22  0426 04/11/22  0746   TROPONINI <0.01 <0.01 <0.01       U/A:  No results found for: NITRITE, COLORU, WBCUA, RBCUA, MUCUS, BACTERIA, CLARITYU, SPECGRAV, LEUKOCYTESUR, BLOODU, GLUCOSEU, AMORPHOUS    ABG  No results found for: JEU9UDV, BEART, O7IJJGHR, PHART, THGBART, YQE8SHA, PO2ART, YGW5FLF        Active Hospital Problems    Diagnosis Date Noted    Chest pain [R07.9] 04/11/2022         PHYSICIANS CERTIFICATION:    I certify that Gideon Finch is expected to be hospitalized for more than 2 midnights based on the following assessment and plan:      ASSESSMENT/PLAN:  Mid sternal chest pain   likely gastric etiology  Stress test negative  Echo unremarkable  GI cocktail    Milk HONG  Gentle iv fluids    Lactic acidosis  Etiology unclear  Trend lacate    DVT Prophylaxis: lovenox  Diet: Diet NPO Exceptions are: Sips of Water with Meds, Ice Chips  Code Status: Full Code  PT/OT Eval Status: n/a     Dispo - inpatient        Zahra Fitzgerald MD    Thank you No primary care provider on file. for the opportunity to be involved in this patient's care. If you have any questions or concerns please feel free to contact me at [phone].

## 2022-04-12 VITALS
OXYGEN SATURATION: 99 % | RESPIRATION RATE: 18 BRPM | TEMPERATURE: 99 F | HEART RATE: 86 BPM | HEIGHT: 76 IN | DIASTOLIC BLOOD PRESSURE: 75 MMHG | WEIGHT: 221.13 LBS | SYSTOLIC BLOOD PRESSURE: 124 MMHG | BODY MASS INDEX: 26.93 KG/M2

## 2022-04-12 LAB
BASOPHILS ABSOLUTE: 0 K/UL (ref 0–0.2)
BASOPHILS RELATIVE PERCENT: 0.1 %
EOSINOPHILS ABSOLUTE: 0 K/UL (ref 0–0.6)
EOSINOPHILS RELATIVE PERCENT: 0.1 %
GLUCOSE BLD-MCNC: 118 MG/DL (ref 70–99)
GLUCOSE BLD-MCNC: 137 MG/DL (ref 70–99)
HCT VFR BLD CALC: 40 % (ref 40.5–52.5)
HEMOGLOBIN: 13.6 G/DL (ref 13.5–17.5)
LACTIC ACID: 1.4 MMOL/L (ref 0.4–2)
LYMPHOCYTES ABSOLUTE: 1.8 K/UL (ref 1–5.1)
LYMPHOCYTES RELATIVE PERCENT: 12.9 %
MCH RBC QN AUTO: 31.1 PG (ref 26–34)
MCHC RBC AUTO-ENTMCNC: 34.1 G/DL (ref 31–36)
MCV RBC AUTO: 91.2 FL (ref 80–100)
MONOCYTES ABSOLUTE: 1.5 K/UL (ref 0–1.3)
MONOCYTES RELATIVE PERCENT: 11 %
NEUTROPHILS ABSOLUTE: 10.4 K/UL (ref 1.7–7.7)
NEUTROPHILS RELATIVE PERCENT: 75.9 %
PDW BLD-RTO: 13.7 % (ref 12.4–15.4)
PERFORMED ON: ABNORMAL
PERFORMED ON: ABNORMAL
PLATELET # BLD: 228 K/UL (ref 135–450)
PMV BLD AUTO: 7.7 FL (ref 5–10.5)
RBC # BLD: 4.39 M/UL (ref 4.2–5.9)
WBC # BLD: 13.7 K/UL (ref 4–11)

## 2022-04-12 PROCEDURE — 6370000000 HC RX 637 (ALT 250 FOR IP): Performed by: INTERNAL MEDICINE

## 2022-04-12 PROCEDURE — 2580000003 HC RX 258: Performed by: INTERNAL MEDICINE

## 2022-04-12 PROCEDURE — G0378 HOSPITAL OBSERVATION PER HR: HCPCS

## 2022-04-12 PROCEDURE — 96376 TX/PRO/DX INJ SAME DRUG ADON: CPT

## 2022-04-12 PROCEDURE — 96372 THER/PROPH/DIAG INJ SC/IM: CPT

## 2022-04-12 PROCEDURE — 80048 BASIC METABOLIC PNL TOTAL CA: CPT

## 2022-04-12 PROCEDURE — 6360000002 HC RX W HCPCS: Performed by: INTERNAL MEDICINE

## 2022-04-12 PROCEDURE — 85025 COMPLETE CBC W/AUTO DIFF WBC: CPT

## 2022-04-12 PROCEDURE — 36415 COLL VENOUS BLD VENIPUNCTURE: CPT

## 2022-04-12 PROCEDURE — 83605 ASSAY OF LACTIC ACID: CPT

## 2022-04-12 PROCEDURE — C9113 INJ PANTOPRAZOLE SODIUM, VIA: HCPCS | Performed by: INTERNAL MEDICINE

## 2022-04-12 RX ORDER — PANTOPRAZOLE SODIUM 40 MG/1
40 TABLET, DELAYED RELEASE ORAL
Qty: 30 TABLET | Refills: 0 | Status: SHIPPED | OUTPATIENT
Start: 2022-04-12

## 2022-04-12 RX ORDER — ATORVASTATIN CALCIUM 40 MG/1
40 TABLET, FILM COATED ORAL NIGHTLY
Qty: 30 TABLET | Refills: 3 | Status: SHIPPED | OUTPATIENT
Start: 2022-04-12

## 2022-04-12 RX ORDER — ONDANSETRON 4 MG/1
4 TABLET, ORALLY DISINTEGRATING ORAL EVERY 8 HOURS PRN
Qty: 20 TABLET | Refills: 0 | Status: SHIPPED | OUTPATIENT
Start: 2022-04-12

## 2022-04-12 RX ORDER — POLYETHYLENE GLYCOL 3350 17 G/17G
17 POWDER, FOR SOLUTION ORAL DAILY PRN
Qty: 527 G | Refills: 1 | Status: SHIPPED | OUTPATIENT
Start: 2022-04-12 | End: 2022-05-12

## 2022-04-12 RX ADMIN — ASPIRIN 81 MG 81 MG: 81 TABLET ORAL at 09:23

## 2022-04-12 RX ADMIN — Medication 10 ML: at 09:23

## 2022-04-12 RX ADMIN — ENOXAPARIN SODIUM 40 MG: 40 INJECTION SUBCUTANEOUS at 09:23

## 2022-04-12 RX ADMIN — PANTOPRAZOLE SODIUM 40 MG: 40 INJECTION, POWDER, FOR SOLUTION INTRAVENOUS at 09:23

## 2022-04-12 ASSESSMENT — PAIN SCALES - GENERAL
PAINLEVEL_OUTOF10: 0

## 2022-04-12 NOTE — PROGRESS NOTES
4 Eyes Admission Assessment     I agree as the admission nurse that 2 RN's have performed a thorough Head to Toe Skin Assessment on the patient. ALL assessment sites listed below have been assessed on admission. Areas assessed by both nurses:   [x]   Head, Face, and Ears   [x]   Shoulders, Back, and Chest  [x]   Arms, Elbows, and Hands   [x]   Coccyx, Sacrum, and Ischium  [x]   Legs, Feet, and Heels        Does the Patient have Skin Breakdown?   No         Veto Prevention initiated:  No   Wound Care Orders initiated:  No      [facility] nurse consulted for Pressure Injury (Stage 3,4, Unstageable, DTI, NWPT, and Complex wounds) or Veto score 18 or lower:  No      Nurse 1 eSignature: Electronically signed by Dominique Saunders RN on 4/11/22 at 8:04 AM EDT    **SHARE this note so that the co-signing nurse is able to place an eSignature**    Nurse 2 eSignature: Electronically signed by Valeria Fajardo RN on 4/11/22 at 8:10 AM EDT
Discharge order received. Patient informed of discharge order. Discharge instructions reviewed with patient. Copy of discharge instructions given to patient. Signed prescriptions filled with meds to bed Patient verbalized understanding, denies needs or questions at this time. IV and telemetry removed. All patient belongings packed and sent with patient upon discharge.  Patient left and family drove him home
Patient admitted from ER via stretcher to 3528. Patient alert and oriented. VSS. Telemetry monitoring applied. Normal sinus rhythm. Safety precautions in place. Report given to on coming [address]. Will continue to monitor.
Pt refused cpap for tonight.
Pt returned from echo.
Pt to echo, and nuc med for stress test
rn called lab to see about having lactic acid drawn since pt was off the floor for testing. Per michelle in lab someone will come up to draw it.
rn updated pt's cousin Susan Rogers with his consent. Rn mentioned that pt had stated he would like his mom updated but she is 80 and the cousin is helping to care for her.
epigastric pain  Clinical Indicators: Per Cardiology: Echo today - normal EF and diastolic   function. No evidence of MI or scar on nuclear stress test. Patient ok to be   discharged. Per H&P: Mid sternal chest pain likely gastric etiology. GI   cocktail. Treatment: Cardiology consult, EKG, ECHO, stress test, GI cocktail, IV   Protonix daily. Options provided:  -- Chest pain likely due to GERD  -- Other - I will add my own diagnosis  -- Disagree - Not applicable / Not valid  -- Disagree - Clinically unable to determine / Unknown  -- Refer to Clinical Documentation Reviewer    PROVIDER RESPONSE TEXT:    This patient has chest pain likely due to GERD.     Query created by: Carrie Cash on 4/12/2022 7:01 AM      Electronically signed by:  Josh Jaramillo MD 4/12/2022 9:37 AM

## 2022-04-12 NOTE — FLOWSHEET NOTE
04/12/22 1349   Encounter Summary   Services provided to: Patient   Referral/Consult From: Rounding   Continue Visiting   (4/12/22, LOVE )   Complexity of Encounter Moderate   Length of Encounter 15 minutes   Routine   Type Initial   Assessment Calm; Approachable   Intervention Nurtured hope   Outcome Expressed gratitude

## 2022-04-12 NOTE — DISCHARGE SUMMARY
Hospital Discharge Summary    Patient's PCP: No primary care provider on file. Admit Date: 4/11/2022   Discharge Date: 4/12/22    Admitting Physician: Dr. Jessica Reeder MD  Discharge Physician: Dr. Leyda Cisneros MD     Consults:   IP CONSULT TO HOSPITALIST  IP CONSULT TO CARDIOLOGY  IP CONSULT TO PHARMACY    Brief HPI: The patient is a 45 y.o. male  With pmhx of OAS, DM 2, who presented with sudden onset central chest pain that woke him up at night. Pain was severe,  pressure like, 10/1o in intensity. No radiation. No aggravating or relieving factors. Pain is improved now but still 4/10. He is comfortable now. Lactate was elevated on arrival to Er. No hx of smoking , no alcohol. Family hx of cancer in father. Brief hospital course:   1. Mid sternal chest pain, likely gastric etiology, pain improved after GI cocktail  Stress test negative  Echo unremarkable  Cardiology consulted, recommending statin due to ASCVD risk.      2. Milk HONG  Gentle iv fluids  Encouraged to establish pcp     patient chest pain free at discharged. Invasive procedures:  None     Discharge Diagnoses:   Principal Problem:    Chest pain  Active Problems:    Dehydration  Resolved Problems:    * No resolved hospital problems. *      Physical Exam: /75   Pulse 86   Temp 99 °F (37.2 °C) (Oral)   Resp 18   Ht 6' 4\" (1.93 m)   Wt 221 lb 2 oz (100.3 kg)   SpO2 99%   BMI 26.92 kg/m²   Gen/overall appearance: Not in acute distress. Alert. Head: Normocephalic, atraumatic  Eyes: EOMI, good acuity  ENT:- Oral mucosa moist  Neck: No JVD, thyromegaly  CVS: Nml S1S2, no MRG, RRR  Pulm: Clear bilaterally. No crackles/wheezes  Gastrointestinal: Soft, NT/ND, +BS  Musculoskeletal: No edema. Warm  Neuro: No focal deficit. Moves extremity spontaneously. Psychiatry: Appropriate affect. Not agitated. Skin: Warm, dry with normal turgor.  No rash        Significant diagnostic studies that may require follow up:  Echo Complete    Result Date: 4/11/2022  Transthoracic Echocardiography Report (TTE)  Demographics   Patient Name      Farrah Gaona   Date of Study     04/11/2022          Gender              Male   Patient Number    [id number]          Date of Birth       1970   Visit Number      [id number]           Age                 45 year(s)   Accession Number  [id number]          Room Number         1855   Corporate ID      [id number]            Yanet Villanueva                                                            ANDREA   Ordering          Tariq Chang MD  Interpreting        [facility]  Physician                             Physician           Asher Mckeon MD  Procedure Type of Study   TTE procedure:ECHOCARDIOGRAM COMPLETE 2D W DOPPLER W COLOR. Procedure Date Date: 04/11/2022 Start: 12:02 PM Study Location: [address] Echo Lab Technical Quality: Adequate visualization Indications:Chest pain. Patient Status: Routine Height: 76 inches Weight: 225 pounds BSA: 2.33 m2 BMI: 27.39 kg/m2 BP: 115/53 mmHg  Conclusions   Summary  Normal left ventricle size, wall thickness, and systolic function with an  estimated ejection fraction of 55-60%. No regional wall motion abnormalities  are seen. Diastolic filling parameters suggests normal diastolic function. Estimated pulmonary artery systolic pressure is at 26 mmHg assuming a right  atrial pressure of 3 mmHg. No evidence of significant valvular stenosis or  regurgitation present. Signature   ------------------------------------------------------------------  Electronically signed by Janet Gonzalez MD  (Interpreting physician) on 04/11/2022 at 02:52 PM  ------------------------------------------------------------------   Findings   Left Ventricle  Normal left ventricle size, wall thickness, and systolic function with an  estimated ejection fraction of 55-60%.  No Deceleration Time: 251 msec  E' Septal Velocity: 8.27 cm/s  E' Lateral Velocity: 9.03 cm/s  E/E' ratio: 8.1  PV Peak Velocity: 112 cm/s  PV Peak Gradient: 5.02 mmHg   Aortic Valve   Peak Velocity: 138 cm/s  Peak Gradient: 7.62 mmHg  Aorta   Aortic Root: 3.2 cm      XR CHEST PORTABLE    Result Date: 4/11/2022  Patient: Almyra Carrel  Time Out: 03:31 Exam(s): FILM CXR 1 VIEW  EXAM:   XR Chest, 1 View  CLINICAL HISTORY:   chest pain radiating to back. TECHNIQUE:   Frontal view of the chest.  COMPARISON:   No relevant prior studies available. FINDINGS:   Lungs:  Pulmonary vasculature is slightly prominent but remain sharply defined. Pleural space:  Unremarkable. No pneumothorax. Heart:  Unremarkable. No cardiomegaly. Mediastinum:  Unremarkable. Bones/joints:  Unremarkable. Upper abdomen: There is no pneumoperitoneum under the diaphragm. Electronically signed by Prince Tiffanie MD on 04-11-22 at 6715      No acute findings in the chest.      CTA CHEST ABDOMEN PELVIS W CONTRAST    Result Date: 4/11/2022  Patient: Almyra Carrel  Time Out: 05:24 Exam(s): CTA CHEST With Contrast, CTA ABDOMEN + PELVIS With Contrast  EXAM:   CT Angiography Chest Without and With Intravenous Contrast  CLINICAL HISTORY:   concern for dissection, chest pain with hypotension. TECHNIQUE:   Axial computed tomographic angiography images of the chest without and with intravenous contrast.  CTDI is 14.3 mGy and DLP is 1270 mGy-cm. All CT scans at this facility use dose modulation,  iterative reconstruction, and/or weight based dosing when appropriate to reduce radiation dose to as low as reasonably achievable. MIP reconstructed images were created and reviewed. COMPARISON:   No relevant prior studies available. FINDINGS:   Pulmonary arteries:  Unremarkable. No pulmonary embolism. Aorta:  No acute findings. No thoracic aortic aneurysm. Lungs:  Unremarkable. No mass. No consolidation. Pleural space:  Unremarkable.   No significant effusion. No pneumothorax. Heart:  Unremarkable. No cardiomegaly. No significant pericardial effusion. No evidence of RV dysfunction. Bones/joints:  No acute fracture. No dislocation. Soft tissues:  Unremarkable. Lymph nodes:  Unremarkable. No enlarged lymph nodes. EXAM:   CT Abdomen and Pelvis Without and With Intravenous Contrast  CLINICAL HISTORY:   concern for dissection, chest pain with hypotension. TECHNIQUE:   Axial computed tomographic images of the abdomen and pelvis without and with intravenous contrast.  CTDI is 14.3 mGy and DLP is 1270 mGy-cm. All CT scans at this facility use dose modulation,  iterative reconstruction, and/or weight based dosing when appropriate to reduce radiation dose to as low as reasonably achievable. COMPARISON:   No relevant prior studies available. FINDINGS:   VASCULATURE:   Aorta:  No acute findings. No abdominal aortic aneurysm. No dissection. Celiac trunk and mesenteric arteries:  No acute findings. No occlusion or significant stenosis. Renal arteries:  No acute findings. No occlusion or significant stenosis. Iliac arteries:  No acute findings. No occlusion or significant stenosis. Lung bases:  Unremarkable. No mass. No consolidation. ABDOMEN:   Liver:  Unremarkable. No mass. Gallbladder and bile ducts:  Unremarkable. No calcified stones. No ductal dilation. Pancreas:  Unremarkable. No ductal dilation. No mass. Spleen:  Mild splenomegaly with the spleen measuring 13.3 cm in length. Adrenals:  Unremarkable. No mass. Kidneys and ureters:  Unremarkable. No obstructing stones. No hydronephrosis. No solid mass. Stomach and bowel:  Unremarkable. No obstruction. No mucosal thickening. PELVIS:   Appendix:  No findings to suggest acute appendicitis. Bladder:  Unremarkable. No stones. No mass. Reproductive:  Unremarkable as visualized. ABDOMEN and PELVIS:   Intraperitoneal space:  Unremarkable.   No significant fluid collection. No free air. Bones/joints:  No acute fracture. No dislocation. Soft tissues:  Unremarkable. Lymph nodes:  Unremarkable. No enlarged lymph nodes. Electronically signed by Lovely Rodriguez MD on 04-11-22 at 0662    Negative chest CTA. No evidence of thoracic aortic aneurysm or dissection. _______________________________________________  IMPRESSION:       No acute findings in the arteries of the abdomen and pelvis. No evidence of abdominal aortic aneurysm or dissection. NM MYOCARDIAL SPECT REST EXERCISE OR RX    Result Date: 4/11/2022  Cardiac Perfusion Imaging  Demographics   Patient Name       Joseph Sotelo   Date of Study      04/11/2022         Gender              Male   Patient Number     [id number]         Date of Birth       1970   Visit Number       [id number]          Age                 45 year(s)   Accession Number   [id number]         Room Number         6751   Corporate ID       [id number]           NM Technician       Guilherme Venegas,                                                            RT-N   Nurse              Melisa Garcia,  Miriam        REGINALD Bryant RN                 Physician           Marychuy Hawk MD   Ordering Physician  Procedure Procedure Type:   Nuclear Stress Test:Pharmacological, NM MYOCARDIAL SPECT REST EXERCISE OR  RX   Study location: [facility] - Nuclear Medicine   Indications: Chest pain. Hospital Status: Inpatient. Height: 76 inches Weight: 225 pounds  Conclusions   Summary  There is normal isotope uptake at stress and rest. There is no evidence of  myocardial ischemia or scar. The LVEF Is normal and the LV wall motion is normal.   This is a low risk cardiac scan. Stress Protocols   Resting ECG  Normal sinus rhythm.    Resting HR:70 bpm  Resting BP:115/64 mmHg  Stress Protocol:Pharmacologic - Lexiscan's  Peak HR:122 bpm                              HR/BP product:68433  Peak BP:91/61 mmHg  Predicted HR: 166 bpm  % of predicted HR: 73  Test duration: 1 min  Reason for termination:Completed   ECG Findings  Normal response to lexiscan . Arrhythmias  No significant rhythm abnormality. Symptoms  No chest pain. Complications  Procedure complication was none. Stress Interpretation  Appropriate hemodynamic response to lexiscan with no significant ST  changes. Imaging Protocols   - One Day   Rest                          Stress   Isotope:Myoview/Tetrofosmin   Isotope: Myoview/Tetrofosmin  Isotope dose:12 mCi           Isotope dose:35.6 mCi                                 Technique:      Gated  Imaging Results    Stress ejection    Ejection fraction:73 %    EDV :111 ml    ESV :30 ml    Stroke volume :81 ml    LV mass :144 gr  Medical History  Signatures   ------------------------------------------------------------------  Electronically signed by Serg Cuadra MD  (Interpreting physician) on 04/11/2022 at 14:50  ------------------------------------------------------------------              Treatments: As above.       Discharge Medications:     Medication List      START taking these medications    atorvastatin 40 MG tablet  Commonly known as: LIPITOR  Take 1 tablet by mouth nightly  Notes to patient: Atorvastatin (Lipitor)  USE--  Lower cholesterol, prevent heart attack/ stroke  SIDE EFFECTS-- headache, muscle pain/ weakness     ondansetron 4 MG disintegrating tablet  Commonly known as: ZOFRAN-ODT  Take 1 tablet by mouth every 8 hours as needed for Nausea or Vomiting     pantoprazole 40 MG tablet  Commonly known as: PROTONIX  Take 1 tablet by mouth every morning (before breakfast)  Notes to patient: Pantoprazole  (Protonix)  USE--  Reduce stomach acid, protect stomach lining  SIDE EFFECTS--  Headache, diarrhea     polyethylene glycol 17 g packet  Commonly known as: GLYCOLAX  Take 17 g by mouth daily as needed for Constipation        CONTINUE taking these medications    insulin regular 100 UNIT/ML injection  Commonly known as: HUMULIN R;NOVOLIN R     Levemir FlexTouch 100 UNIT/ML injection pen  Generic drug: insulin detemir     therapeutic multivitamin-minerals tablet           Where to Get Your Medications      These medications were sent to [place], [address] [place]orn [phone] - F [phone]  [address] [facility] [address]    Phone: [phone]   · atorvastatin 40 MG tablet  · ondansetron 4 MG disintegrating tablet  · pantoprazole 40 MG tablet  · polyethylene glycol 17 g packet         Activity: activity as tolerated  Diet: No diet orders on file      Disposition: home  Discharged Condition: Stable  Follow Up:   No follow-up provider specified. Code status:  Prior         Total time spent on discharge, finalizing medications, referrals and arranging outpatient follow up was more than 45 minutes      Thank you Dr. Sparrow primary care provider on file. for the opportunity to be involved in this patients care.

## 2022-04-12 NOTE — CARE COORDINATION
Case Management Assessment            Discharge Note                    Date / Time of Note: 4/12/2022 11:40 AM                  Discharge Note Completed by: Tena Schaefer RN    Patient Name: Anna Willis   YOB: 1970  Diagnosis: Dehydration [E86.0]  Chest pain [R07.9]  Hypotension due to hypovolemia [I95.89, E86.1]   Date / Time: 4/11/2022  2:14 AM    Current PCP: No primary care provider on file. Clinic patient: No    Hospitalization in the last 30 days: No    Advance Directives:  Code Status: Full Code  [place] DNR form completed and on chart: No    Financial:  Payor: Akua Nephew / Plan: Akua Nephew / Product Type: *No Product type* /      Pharmacy:    [address]  Phone: [phone] Fax: [phone]      Assistance purchasing medications?: Potential Assistance Purchasing Medications: No  Assistance provided by Case Management: None at this time    Does patient want to participate in local refill/ meds to beds program?:      Meds To Beds General Rules:  1. Can ONLY be done Monday- Friday between 8:30am-5pm  2. Prescription(s) must be in pharmacy by 3pm to be filled same day  3. Copy of patient's insurance/ prescription drug card and patient face sheet must be sent along with the prescription(s)  4. Cost of Rx cannot be added to hospital bill. If financial assistance is needed, please contact unit  or ;  or  CANNOT provide pharmacy voucher for patients co-pays  5.  Patients can then  the prescription on their way out of the hospital at discharge, or pharmacy can deliver to the bedside if staff is available. (payment due at time of pick-up or delivery - cash, check, or card accepted)     Able to afford home medications/ co-pay costs: Yes    ADLS:  Current PT AM-PAC Score:   /24  Current OT AM-PAC Score: /24      DISCHARGE Disposition: Home- No Services Needed    LOC at discharge: Not Applicable  JOAQUINA Completed: Not Indicated    Notification completed in HENS/PAS?:  Not Applicable    IMM Completed:   Not Indicated    Transportation:  Transportation PLAN for discharge: family   Mode of Transport: Private Car  Reason for medical transport: Not Applicable  Name of Transport Company: Not Applicable  Time of Transport: when family availalbe    Transport form completed: No    Home Care:  [address] ordered at discharge: No  [address]: Not Applicable  Orders faxed: No    Durable Medical Equipment:  DME Provider: NA  Equipment obtained during hospitalization: NA    Home Oxygen and Respiratory Equipment:  Oxygen needed at discharge?: No  [address]: Not Applicable  Portable tank available for discharge?: No    Dialysis:  Dialysis patient: No    Dialysis Center:  Not Applicable    Hospice Services:  Location: Not Applicable  Agency: Not Applicable    Consents signed: No    Referrals made at [facility] for outpatient continued care:  Not Applicable    Additional CM Notes:     CM confirmed  D/C home: pt  States  He  Will have  transport  Home . Denies any  HHC  Needs  At this time  . Patient  To follow up out pt  As  Instructed  :      New Rx:   Meds to [facility] up these medications from [facility], [address]  [address] [facility] [address] Bebetoelsie Agrawal [phone] - F [phone]  1 atorvastatin  2 ondansetron  3 pantoprazole  4 polyethylene glycol        The Plan for Transition of Care is related to the following treatment goals of Dehydration [E86.0]  Chest pain [R07.9]  Hypotension due to hypovolemia [I95.89, E86.1]    The Patient and/or patient representative Roldan Floyd and his family were provided with a choice of provider and agrees with the discharge plan Yes    Freedom of choice list was provided with basic dialogue that supports the patient's individualized plan of care/goals and shares the quality data associated with the providers.  Yes    Care Transitions patient: No    Tahir Bowser RN  [facility]  Case Management Department  Ph: [phone]

## 2022-04-13 LAB
ANION GAP SERPL CALCULATED.3IONS-SCNC: 15 MMOL/L (ref 3–16)
BUN BLDV-MCNC: 8 MG/DL (ref 7–20)
CALCIUM SERPL-MCNC: 9.2 MG/DL (ref 8.3–10.6)
CHLORIDE BLD-SCNC: 106 MMOL/L (ref 99–110)
CO2: 18 MMOL/L (ref 21–32)
CREAT SERPL-MCNC: 0.5 MG/DL (ref 0.9–1.3)
GFR AFRICAN AMERICAN: >60
GFR NON-AFRICAN AMERICAN: >60
GLUCOSE BLD-MCNC: 143 MG/DL (ref 70–99)
POTASSIUM REFLEX MAGNESIUM: 4 MMOL/L (ref 3.5–5.1)
SODIUM BLD-SCNC: 139 MMOL/L (ref 136–145)

## 2022-04-20 ENCOUNTER — OFFICE VISIT (OUTPATIENT)
Dept: CARDIOLOGY CLINIC | Age: 52
End: 2022-04-20
Payer: COMMERCIAL

## 2022-04-20 VITALS
BODY MASS INDEX: 25.32 KG/M2 | WEIGHT: 208 LBS | SYSTOLIC BLOOD PRESSURE: 108 MMHG | HEART RATE: 72 BPM | DIASTOLIC BLOOD PRESSURE: 70 MMHG

## 2022-04-20 DIAGNOSIS — R07.9 CHEST PAIN, UNSPECIFIED TYPE: Primary | ICD-10-CM

## 2022-04-20 PROCEDURE — 99213 OFFICE O/P EST LOW 20 MIN: CPT | Performed by: INTERNAL MEDICINE

## 2022-04-20 ASSESSMENT — ENCOUNTER SYMPTOMS
ABDOMINAL DISTENTION: 0
SHORTNESS OF BREATH: 0
APNEA: 0
CHEST TIGHTNESS: 0
CHOKING: 0
COUGH: 0

## 2022-04-20 NOTE — PROGRESS NOTES
Subjective:      Patient ID: Xi Meyer is a 45 y.o. male. HPI Follow up from hospital for chest pain. Seen by Dr Zion Pederson in hospital.  Not real active. No further chest pain. No exertional sx. No pnd or orthopnea. No palp/tachy/syncope. No edema. Wt stable. Bowled last night. No problems. Past Medical History:   Diagnosis Date    Diabetes mellitus ([address]     Sleep apnea      Past Surgical History:   Procedure Laterality Date    UMBILICAL HERNIA REPAIR  12/18/15    with mesh      Social History     Socioeconomic History    Marital status: Single     Spouse name: Not on file    Number of children: Not on file    Years of education: Not on file    Highest education level: Not on file   Occupational History    Not on file   Tobacco Use    Smoking status: Never Smoker    Smokeless tobacco: Never Used   Vaping Use    Vaping Use: Unknown   Substance and Sexual Activity    Alcohol use: Yes     Alcohol/week: 2.0 - 4.0 standard drinks     Types: 2 - 4 Cans of beer per week     Comment: social     Drug use: No    Sexual activity: Not on file   Other Topics Concern    Not on file   Social History Narrative    Not on file     Social Determinants of Health     Financial Resource Strain:     Difficulty of Paying Living Expenses: Not on file   Food Insecurity:     Worried About Running Out of Food in the Last Year: Not on file    Jones of Food in the Last Year: Not on file   Transportation Needs:     Lack of Transportation (Medical): Not on file    Lack of Transportation (Non-Medical):  Not on file   Physical Activity:     Days of Exercise per Week: Not on file    Minutes of Exercise per Session: Not on file   Stress:     Feeling of Stress : Not on file   Social Connections:     Frequency of Communication with Friends and Family: Not on file    Frequency of Social Gatherings with Friends and Family: Not on file    Attends [employer] Services: Not on file   [employer] of [employer]s or Organizations: Not on file    Attends Club or Organization Meetings: Not on file    Marital Status: Not on file   Intimate Partner Violence:     Fear of Current or Ex-Partner: Not on file    Emotionally Abused: Not on file    Physically Abused: Not on file    Sexually Abused: Not on file   Housing Stability:     Unable to Pay for Housing in the Last Year: Not on file    Number of [place] in the Last Year: Not on file    Unstable Housing in the Last Year: Not on file     FH reviewed    Vitals:    04/20/22 1402   BP: 108/70   Pulse: 72     Wt 208    Review of Systems   Constitutional: Negative for activity change and fatigue. Respiratory: Negative for apnea, cough, choking, chest tightness and shortness of breath. Cardiovascular: Negative for chest pain, palpitations and leg swelling. No PND or orthopnea. No tachycardia. Gastrointestinal: Negative for abdominal distention. Musculoskeletal: Negative for myalgias. Neurological: Negative for dizziness, syncope and light-headedness. Psychiatric/Behavioral: Negative for agitation, behavioral problems and confusion. All other systems reviewed and are negative. Objective:   Physical Exam  Constitutional:       General: He is not in acute distress. Appearance: Normal appearance. He is well-developed. HENT:      Head: Normocephalic and atraumatic. Right Ear: External ear normal.      Left Ear: External ear normal.   Neck:      Vascular: No JVD. Cardiovascular:      Rate and Rhythm: Normal rate and regular rhythm. Heart sounds: Normal heart sounds. No gallop. Pulmonary:      Effort: Pulmonary effort is normal. No respiratory distress. Breath sounds: Normal breath sounds. No wheezing or rales. Abdominal:      General: Bowel sounds are normal.      Palpations: Abdomen is soft. Tenderness: There is no abdominal tenderness. Musculoskeletal:         General: Normal range of motion.       Cervical back: Normal range of motion and neck supple. Skin:     General: Skin is warm and dry. Neurological:      General: No focal deficit present. Mental Status: He is alert and oriented to person, place, and time. Psychiatric:         Behavior: Behavior normal.         Assessment:       Diagnosis Orders   1. Chest pain, unspecified type             Plan:      CV stable. No further cp. No exertional sx. Reviewed previous records and testing including hosp and myoview 4/22. No changes. Will have follow up PRN.          Lasha Ellington MD

## 2022-06-22 PROCEDURE — 99283 EMERGENCY DEPT VISIT LOW MDM: CPT

## 2022-06-23 ENCOUNTER — HOSPITAL ENCOUNTER (EMERGENCY)
Age: 52
Discharge: HOME | End: 2022-06-23
Attending: EMERGENCY MEDICINE
Payer: COMMERCIAL

## 2022-06-23 VITALS
WEIGHT: 193 LBS | BODY MASS INDEX: 23.5 KG/M2 | DIASTOLIC BLOOD PRESSURE: 88 MMHG | SYSTOLIC BLOOD PRESSURE: 138 MMHG | TEMPERATURE: 98.2 F | RESPIRATION RATE: 18 BRPM | HEART RATE: 71 BPM | OXYGEN SATURATION: 100 % | HEIGHT: 76 IN

## 2022-06-23 DIAGNOSIS — K59.00 CONSTIPATION, UNSPECIFIED CONSTIPATION TYPE: Primary | ICD-10-CM

## 2022-06-23 RX ORDER — POLYETHYLENE GLYCOL 3350 17 G/17G
17 POWDER, FOR SOLUTION ORAL DAILY
Qty: 510 G | Refills: 0 | Status: SHIPPED | OUTPATIENT
Start: 2022-06-23 | End: 2022-07-23

## 2022-06-23 ASSESSMENT — ENCOUNTER SYMPTOMS
CONSTIPATION: 1
RESPIRATORY NEGATIVE: 1
RECTAL PAIN: 1
EYES NEGATIVE: 1

## 2022-06-23 ASSESSMENT — PAIN - FUNCTIONAL ASSESSMENT: PAIN_FUNCTIONAL_ASSESSMENT: NONE - DENIES PAIN

## 2022-06-23 NOTE — ED PROVIDER NOTES
ED Attending Attestation Note     Date of evaluation: 6/22/2022    This patient was seen by the resident. I have seen and examined the patient, agree with the workup, evaluation, management and diagnosis. The care plan has been discussed. I have reviewed the ECG and concur with the resident's interpretation. My assessment reveals a well-appearing middle-aged male presents to the emergency room today complaining of constipation and some lower abdominal pain. On exam his abdomen is soft, nontender, nondistended, no palpable hernias organomegaly states he had several bowel movements after digital rectal exam here and feels somewhat better.      Flakita Burks MD  06/23/22 0634

## 2022-06-23 NOTE — ED NOTES
Patient up to bathroom several times passing small amounts of formed stool.       Daisy Mix RN  06/23/22 [id number]

## 2022-06-23 NOTE — Clinical Note
Tracey Addison was seen and treated in our emergency department on 6/22/2022. He may return to work on 06/24/2022. If you have any questions or concerns, please don't hesitate to call.       Marylee Buddy, MD

## 2022-06-23 NOTE — ED PROVIDER NOTES
[id number] [facility] RESIDENT NOTE       Date of evaluation: 6/22/2022    Chief Complaint     Constipation      History of Present Illness     Adriana Mcadams is a 45 y.o. male with hx of TBI who presents with constipation. The patient reports that he has not had a bowel movement since Sunday. He states that he feels like he does need to have a bowel movement and feels like there is a ball of stool stuck in his bottom. He has not had any blood in his stool previously, states it was 1 week prior to that. He has tried some prune juice but no other laxatives consistently. He does not have any abdominal pain. No vomiting or fevers. He is still urinating. Review of Systems     Review of Systems   Constitutional: Negative. HENT: Negative. Eyes: Negative. Respiratory: Negative. Cardiovascular: Negative. Gastrointestinal: Positive for constipation and rectal pain. Past Medical, Surgical, Family, and Social History     He has a past medical history of Diabetes mellitus ([address] and Sleep apnea. He has a past surgical history that includes Umbilical hernia repair (12/18/15). His family history includes Cancer in his father; Newt Myra in his father; Diabetes in his mother; Heart Attack in his father. He reports that he has never smoked. He has never used smokeless tobacco. He reports current alcohol use of about 2.0 - 4.0 standard drinks of alcohol per week. He reports that he does not use drugs.     Medications     Previous Medications    ATORVASTATIN (LIPITOR) 40 MG TABLET    Take 1 tablet by mouth nightly    INSULIN DETEMIR (LEVEMIR FLEXTOUCH) 100 UNIT/ML INJECTION PEN    Inject 10 Units into the skin nightly    INSULIN REGULAR (HUMULIN R;NOVOLIN R) 100 UNIT/ML INJECTION    Inject 10 Units into the skin 3 times daily (before meals)    MULTIPLE VITAMINS-MINERALS (THERAPEUTIC MULTIVITAMIN-MINERALS) TABLET    Take 1 tablet by mouth daily ONDANSETRON (ZOFRAN-ODT) 4 MG DISINTEGRATING TABLET    Take 1 tablet by mouth every 8 hours as needed for Nausea or Vomiting    PANTOPRAZOLE (PROTONIX) 40 MG TABLET    Take 1 tablet by mouth every morning (before breakfast)       Allergies     He is allergic to molds & smuts, other, and pollen extract. Physical Exam     INITIAL VITALS: BP: 129/81, Temp: 98.2 °F (36.8 °C), Heart Rate: 71, Resp: 18, SpO2: 100 %   Physical Exam  Constitutional:       Appearance: He is not ill-appearing or diaphoretic. HENT:      Head: Normocephalic and atraumatic. Mouth/Throat:      Mouth: Mucous membranes are moist.      Pharynx: No oropharyngeal exudate or posterior oropharyngeal erythema. Eyes:      Extraocular Movements: Extraocular movements intact. Conjunctiva/sclera: Conjunctivae normal.      Pupils: Pupils are equal, round, and reactive to light. Cardiovascular:      Rate and Rhythm: Normal rate and regular rhythm. Pulses: Normal pulses. Pulmonary:      Effort: Pulmonary effort is normal. No respiratory distress. Breath sounds: Normal breath sounds. Abdominal:      General: There is no distension. Palpations: There is no mass. Tenderness: There is no abdominal tenderness. There is no guarding or rebound. Genitourinary:     Comments: Rectal exam performed with Joycelyn BLAND present as chaperone. There is an external hemorrhoid which is nonthrombosed. There is palpable stool in the rectal vault which does not feel to be in a large obstructive stool ball pattern, this is only palpable with the fingertip and only a small amount of stool was able to be manually removed. Musculoskeletal:      Cervical back: Normal range of motion. Skin:     General: Skin is warm and dry. Capillary Refill: Capillary refill takes less than 2 seconds. Neurological:      Mental Status: He is alert. Cranial Nerves: No cranial nerve deficit. Motor: No weakness.          DiagnosticResults RADIOLOGY:  No orders to display       LABS:   No results found for this visit on 06/23/22. RECENT VITALS:  BP: 125/89, Temp: 98.2 °F (36.8 °C), Heart Rate: 71,Resp: 18, SpO2: 99 %     Procedures     None    ED Course     Nursing Notes, Past Medical Hx, Past Surgical Hx, Social Hx, Allergies, and Family Hx were reviewed. The patient was given the followingmedications:  Orders Placed This Encounter   Medications    polyethylene glycol (GLYCOLAX) 17 GM/SCOOP powder     Sig: Take 17 g by mouth daily     Dispense:  510 g     Refill:  0       CONSULTS:  None    MEDICAL DECISION MAKING / ASSESSMENT / Denis Cuong is a 45 y.o. male with history of prior TBI presents to the emergency department for constipation. The patient has a soft, nontender abdomen without evidence of hernia and has no vomiting to suggest acute bowel obstruction. He is still passing gas. On rectal examination there is an external hemorrhoid, there is stool palpable within the rectum, and only a small amount was able to be disimpacted. Following disimpaction, the patient was able to have a bowel movement was feeling somewhat better. He overall is well-appearing, does not have a distended abdomen and has no signs of acute intra-abdominal emergency on my examination. At this time, I feel the patient is stable for discharge to take laxative medications and increase fiber and hydration at home. The patient has a primary care doctor with [facility], will follow up with them and return to the ER for any worsening symptoms including worsening abdominal pain, vomiting, fevers, or blood in stools. This patient was also evaluated by the attending physician. All care plans were discussed and agreed upon. Clinical Impression     1. Constipation, unspecified constipation type        Disposition     PATIENT REFERRED TO:  No follow-up provider specified.     DISCHARGE MEDICATIONS:  New Prescriptions    POLYETHYLENE GLYCOL [facility]) 17 GM/SCOOP POWDER    Take 17 g by mouth daily       DISPOSITION Decision To Discharge 06/23/2022 02:07:48 AM       Nehemias Anaya MD  Resident  06/23/22 [id number]